# Patient Record
Sex: MALE | URBAN - METROPOLITAN AREA
[De-identification: names, ages, dates, MRNs, and addresses within clinical notes are randomized per-mention and may not be internally consistent; named-entity substitution may affect disease eponyms.]

---

## 2024-01-25 DIAGNOSIS — K74.60 CIRRHOSIS: Primary | ICD-10-CM

## 2024-01-31 ENCOUNTER — TELEPHONE (OUTPATIENT)
Dept: GASTROENTEROLOGY | Facility: CLINIC | Age: 74
End: 2024-01-31
Payer: OTHER GOVERNMENT

## 2024-01-31 NOTE — TELEPHONE ENCOUNTER
----- Message from Jeaneth Chaidez MA sent at 1/31/2024  8:35 AM CST -----  Regarding: FW: Referral  Contact: HUE Hernandez  I looked under active requests but I don't understand.  ----- Message -----  From: Carolina Pinto  Sent: 1/31/2024   8:30 AM CST  To: [facility] Staff  Subject: Referral                                         Per phone call with Mary she stated that a referral was sent over on 01/24.2024, 01/19/2024 and 01/03/2024 along with the authorization.  The patient needs to be schedule for an appointment regarding Cirrhosis.  Please return call at [phone] and ask for Mary and contact patient at [phone] (home).    Thanks,  SJ

## 2024-03-13 ENCOUNTER — TELEPHONE (OUTPATIENT)
Dept: GASTROENTEROLOGY | Facility: CLINIC | Age: 74
End: 2024-03-13
Payer: OTHER GOVERNMENT

## 2024-03-13 NOTE — TELEPHONE ENCOUNTER
----- Message from Frances Astorga MA sent at 3/12/2024 12:27 PM CDT -----    ----- Message -----  From: Shelia Gee  Sent: 3/12/2024  11:14 AM CDT  To: [facility] Staff    Patient is waiting for call back to scheduled please call back at [phone].  Refer # [id number].        Thanks  [facility]

## 2024-03-13 NOTE — TELEPHONE ENCOUNTER
----- Message from Meaghan Sargent MA sent at 3/13/2024  2:27 PM CDT -----  [facility]    ----- Message -----  From: Shey Spears  Sent: 3/13/2024   1:56 PM CDT  To: [facility]vy [facility] Staff    Call Type: Staff Message    Who Called: Fallon [facility]   Call back number: [phone]  Nature of the call: Updated status on Referral  Additional information: n/a

## 2024-04-04 ENCOUNTER — TELEPHONE (OUTPATIENT)
Dept: GASTROENTEROLOGY | Facility: CLINIC | Age: 74
End: 2024-04-04
Payer: OTHER GOVERNMENT

## 2024-04-04 NOTE — TELEPHONE ENCOUNTER
----- Message from Frances Astorga MA sent at 4/4/2024 12:23 PM CDT -----  Regarding: FW: Referral  Contact: HUE [facility]ogue in [facility]    ----- Message -----  From: Carolina Pinto  Sent: 4/4/2024  10:59 AM CDT  To: Chevy CROWLEY Staff  Subject: Referral                                         Per phone call with Lennie, she stated that was referral was sent and is needing to schedule an appointment to see the physician regarding scrotitis of the liver.  Please return call at [phone].    Thanks,  SJ

## 2024-04-04 NOTE — TELEPHONE ENCOUNTER
----- Message from Frances Astorga MA sent at 4/4/2024 12:23 PM CDT -----  Regarding: FW: Referral  Contact: HUE [facility]ogue in [facility]    ----- Message -----  From: Carolina Pinto  Sent: 4/4/2024  10:59 AM CDT  To: Chevy CROW[facility] Staff  Subject: Referral                                         Per phone call with Lennie, she stated that was referral was sent and is needing to schedule an appointment to see the physician regarding scrotitis of the liver.  Please return call at [phone].    Thanks,  SJ

## 2024-05-06 ENCOUNTER — TELEPHONE (OUTPATIENT)
Dept: GASTROENTEROLOGY | Facility: CLINIC | Age: 74
End: 2024-05-06
Payer: OTHER GOVERNMENT

## 2024-05-06 NOTE — TELEPHONE ENCOUNTER
----- Message from Shelia Gee sent at 5/6/2024 10:41 AM CDT -----  [facility] .....

## 2024-05-15 ENCOUNTER — TELEPHONE (OUTPATIENT)
Dept: GASTROENTEROLOGY | Facility: CLINIC | Age: 74
End: 2024-05-15
Payer: OTHER GOVERNMENT

## 2024-05-15 NOTE — TELEPHONE ENCOUNTER
----- Message from Frances Astorga MA sent at 5/13/2024  2:31 PM CDT -----  Regarding: referral  Contact: self  [facility] looks like the [facility] called and [facility]?  ----- Message -----  From: Eli Patten  Sent: 5/13/2024   2:17 PM CDT  To: Chevy CROW[facility] Staff    Type: Staff Message    Caller: Delta Cadenamony  Call Back Number: [phone]  Nature of the Call: pt checking the status of his referral for liver scan  Additional Information: na

## 2024-06-27 ENCOUNTER — OFFICE VISIT (OUTPATIENT)
Dept: UROLOGY | Facility: CLINIC | Age: 74
End: 2024-06-27
Payer: MEDICARE

## 2024-06-27 VITALS
SYSTOLIC BLOOD PRESSURE: 136 MMHG | DIASTOLIC BLOOD PRESSURE: 70 MMHG | HEIGHT: 67 IN | WEIGHT: 194 LBS | HEART RATE: 71 BPM | BODY MASS INDEX: 30.45 KG/M2

## 2024-06-27 DIAGNOSIS — N48.89 PENILE SWELLING: ICD-10-CM

## 2024-06-27 DIAGNOSIS — I86.1 BILATERAL VARICOCELES: ICD-10-CM

## 2024-06-27 DIAGNOSIS — R31.29 HEMATURIA, MICROSCOPIC: ICD-10-CM

## 2024-06-27 DIAGNOSIS — R30.0 BURNING WITH URINATION: Primary | ICD-10-CM

## 2024-06-27 LAB
BILIRUBIN, UA POC OHS: NEGATIVE
BLOOD, UA POC OHS: ABNORMAL
CLARITY, UA POC OHS: CLEAR
COLOR, UA POC OHS: YELLOW
GLUCOSE, UA POC OHS: >=1000
KETONES, UA POC OHS: NEGATIVE
LEUKOCYTES, UA POC OHS: NEGATIVE
NITRITE, UA POC OHS: NEGATIVE
PH, UA POC OHS: 5.5
PROTEIN, UA POC OHS: NEGATIVE
SPECIFIC GRAVITY, UA POC OHS: 1.01
UROBILINOGEN, UA POC OHS: 1

## 2024-06-27 PROCEDURE — 81003 URINALYSIS AUTO W/O SCOPE: CPT | Mod: QW,S$GLB,, | Performed by: NURSE PRACTITIONER

## 2024-06-27 PROCEDURE — 99204 OFFICE O/P NEW MOD 45 MIN: CPT | Mod: S$GLB,,, | Performed by: NURSE PRACTITIONER

## 2024-06-27 RX ORDER — ALOGLIPTIN 25 MG/1
1 TABLET, FILM COATED ORAL DAILY
COMMUNITY
Start: 2024-02-14

## 2024-06-27 RX ORDER — LOSARTAN POTASSIUM 100 MG/1
1 TABLET ORAL DAILY
COMMUNITY
Start: 2024-04-08

## 2024-06-27 RX ORDER — FERROUS SULFATE 325(65) MG
TABLET ORAL
COMMUNITY
Start: 2023-10-13

## 2024-06-27 RX ORDER — GABAPENTIN 100 MG/1
100 CAPSULE ORAL
COMMUNITY
Start: 2024-04-09

## 2024-06-27 RX ORDER — MONTELUKAST SODIUM 10 MG/1
10 TABLET ORAL
COMMUNITY
Start: 2024-02-14

## 2024-06-27 RX ORDER — POTASSIUM CHLORIDE 750 MG/1
1 TABLET, EXTENDED RELEASE ORAL DAILY
COMMUNITY
Start: 2024-02-14

## 2024-06-27 RX ORDER — MECLIZINE HYDROCHLORIDE 25 MG/1
TABLET ORAL
COMMUNITY

## 2024-06-27 RX ORDER — CIPROFLOXACIN 500 MG/1
500 TABLET ORAL
COMMUNITY
Start: 2024-06-26

## 2024-06-27 RX ORDER — SIMVASTATIN 80 MG/1
TABLET, FILM COATED ORAL
COMMUNITY

## 2024-06-27 RX ORDER — TRAMADOL HYDROCHLORIDE 50 MG/1
TABLET ORAL
COMMUNITY

## 2024-06-27 RX ORDER — SULFACETAMIDE SODIUM 100 MG/ML
SOLUTION/ DROPS OPHTHALMIC
COMMUNITY

## 2024-06-27 RX ORDER — OMEPRAZOLE 40 MG/1
1 CAPSULE, DELAYED RELEASE ORAL DAILY
COMMUNITY

## 2024-06-27 RX ORDER — SILDENAFIL 100 MG/1
100 TABLET, FILM COATED ORAL
COMMUNITY
Start: 2024-04-09

## 2024-06-27 RX ORDER — FUROSEMIDE 40 MG/1
1 TABLET ORAL 2 TIMES DAILY
COMMUNITY
Start: 2024-02-14

## 2024-06-27 RX ORDER — DOCUSATE SODIUM 100 MG/1
200 CAPSULE, LIQUID FILLED ORAL
COMMUNITY
Start: 2024-02-14

## 2024-06-27 RX ORDER — METFORMIN HYDROCHLORIDE 1000 MG/1
TABLET ORAL
COMMUNITY
Start: 2024-02-14

## 2024-06-27 RX ORDER — NEOMYCIN SULFATE, POLYMYXIN B SULFATE AND DEXAMETHASONE 3.5; 10000; 1 MG/ML; [USP'U]/ML; MG/ML
SUSPENSION/ DROPS OPHTHALMIC
COMMUNITY

## 2024-06-27 RX ORDER — CETIRIZINE HYDROCHLORIDE 10 MG/1
10 TABLET ORAL
COMMUNITY
Start: 2024-02-14

## 2024-06-27 RX ORDER — ALBUTEROL SULFATE 0.83 MG/ML
SOLUTION RESPIRATORY (INHALATION)
COMMUNITY
Start: 2024-02-14

## 2024-06-27 RX ORDER — ATORVASTATIN CALCIUM 80 MG/1
80 TABLET, FILM COATED ORAL
COMMUNITY
Start: 2024-02-14

## 2024-06-27 RX ORDER — FLUTICASONE PROPIONATE AND SALMETEROL 250; 50 UG/1; UG/1
POWDER RESPIRATORY (INHALATION)
COMMUNITY
Start: 2024-02-14

## 2024-06-27 NOTE — PROGRESS NOTES
Subjective:       Patient ID: Delta Pedersen is a 74 y.o. male.    Chief Complaint: Urinary Tract Infection      HPI: 74-year-old male, new to [facility]    Patient presents with complaint of burning and swelling to the scrotum and penis.    Patient went to the emergency room 2 days ago on 06/25/2024.    Patient was started on Cipro for UTI.    He had a CT which showed a fatty infiltration of the liver with nodular contour consistent with cirrhosis with interval worsening in the mild ascites and mesenteric haziness.  Also shows cholelithiasis.    He had an ultrasound which showed bilateral varicoceles.  Urine culture from ER visit showed no pathogens.      Patient denies any significant frequency or urgency.  Denies any odor to the urine.  Denies any fever or body aches.  Denies seeing any blood in urine.    Denies any pain to the penis.  Denies any discharge from the penis.    No other urinary complaints at this time.         Past Medical History:   Past Medical History:   Diagnosis Date    Allergies     Chronic pain     Chronic sinusitis, unspecified     DM (diabetes mellitus)     Elevated cholesterol     HTN (hypertension)        Past Surgical Historical:   Past Surgical History:   Procedure Laterality Date    HIP ARTHROPLASTY      LEG SURGERY          Medications:   Medication List with Changes/Refills   Current Medications    ALBUTEROL (PROVENTIL) 2.5 MG /3 ML (0.083 %) NEBULIZER SOLUTION    USE 1 VIAL VIA NEBULIZER EVERY 4 TO 6 HOURS AS NEEDED    ALOGLIPTIN (NESINA) 25 MG TAB    Take 1 tablet by mouth once daily.    APIXABAN (ELIQUIS) 5 MG TAB    Take 5 mg by mouth.    ATORVASTATIN (LIPITOR) 80 MG TABLET    Take 80 mg by mouth.    BRINZOLAMIDE-BRIMONIDINE (SIMBRINZA) 1-0.2 % DRPS    Apply to eye.    CETIRIZINE (ZYRTEC) 10 MG TABLET    Take 10 mg by mouth.    CIPROFLOXACIN HCL (CIPRO) 500 MG TABLET    Take 500 mg by mouth.    DOCUSATE SODIUM (COLACE) 100 MG CAPSULE    Take 200 mg by  mouth.    EMPAGLIFLOZIN (JARDIANCE) 25 MG TABLET    Take 1 tablet by mouth once daily.    FERROUS SULFATE (FEOSOL) 325 MG (65 MG IRON) TAB TABLET    TAKE 1 TABLET BY MOUTH 3 TIMES A DAY AS DIRECTED    FLUTICASONE-SALMETEROL DISKUS INHALER 250-50 MCG    Inhale into the lungs.    FUROSEMIDE (LASIX) 40 MG TABLET    Take 1 tablet by mouth 2 (two) times daily.    GABAPENTIN (NEURONTIN) 100 MG CAPSULE    Take 100 mg by mouth.    LOSARTAN (COZAAR) 100 MG TABLET    Take 1 tablet by mouth once daily.    MECLIZINE (ANTIVERT) 25 MG TABLET    TAKE 1 TABLET BY MOUTH THREE TIMES A DAY AS NEEDED FOR DIZZINESS    METFORMIN (GLUCOPHAGE) 1000 MG TABLET    Take 1 tablet twice a day by oral route.    MONTELUKAST (SINGULAIR) 10 MG TABLET    Take 10 mg by mouth.    NEOMYCIN-POLYMYXIN-DEXAMETHASONE (MAXITROL) 3.5MG/ML-10,000 UNIT/ML-0.1 % DRPS    INSTILL 2 DROPS INTO AFFECTED EYE(S) TWICE A DAY EVERY 4 HOURS WHILE AWAKE FOR 2-3 DAYS    OMEPRAZOLE (PRILOSEC) 40 MG CAPSULE    Take 1 capsule by mouth once daily.    POTASSIUM CHLORIDE (KLOR-CON) 10 MEQ TBSR    Take 1 tablet by mouth once daily.    SILDENAFIL (VIAGRA) 100 MG TABLET    Take 100 mg by mouth.    SIMVASTATIN (ZOCOR) 80 MG TABLET        SULFACETAMIDE SODIUM 10% (BLEPH-10) 10 % OPHTHALMIC SOLUTION    PLACE 2 DROPS IN BOTH EYES EVERY 4 HOURS WHILE AWAKE FOR 10 DAYS AS DIRECTED    TRAMADOL (ULTRAM) 50 MG TABLET    TAKE 1 TO 2 TABLETS BY MOUTH EVERY 8 HOURS AS NEEDED WITH 500 MG OF TYLENOL    WARFARIN SODIUM (COUMADIN IV)            Past Social History:   Social History     Socioeconomic History    Marital status: Unknown   Tobacco Use    Smoking status: Former     Types: Cigarettes     Passive exposure: Past    Smokeless tobacco: Never   Substance and Sexual Activity    Alcohol use: Yes     Alcohol/week: 1.0 standard drink of alcohol     Types: 1 Cans of beer per week    Drug use: Never    Sexual activity: Yes       Allergies:   Review of patient's allergies indicates:   Allergen  Reactions    Pcn [penicillins]         Family History:   Family History   Problem Relation Name Age of Onset    Cancer Father      Stroke Mother          Review of Systems:  Review of Systems   Constitutional:  Negative for activity change and appetite change.   HENT:  Negative for congestion and dental problem.    Eyes:  Negative for visual disturbance.   Respiratory:  Negative for chest tightness and shortness of breath.    Cardiovascular:  Negative for chest pain.   Gastrointestinal:  Negative for abdominal distention and abdominal pain.   Genitourinary:  Positive for dysuria, penile swelling and scrotal swelling. Negative for decreased urine volume, difficulty urinating, enuresis, flank pain, frequency, genital sores, hematuria, penile discharge, penile pain, testicular pain and urgency.   Musculoskeletal:  Negative for back pain and neck pain.   Skin:  Negative for color change.   Neurological:  Negative for dizziness.   Hematological:  Negative for adenopathy.   Psychiatric/Behavioral:  Negative for agitation, behavioral problems and confusion.        Physical Exam:  Physical Exam  Vitals and nursing note reviewed.   Constitutional:       Appearance: He is well-developed.   HENT:      Head: Normocephalic.   Eyes:      Pupils: Pupils are equal, round, and reactive to light.   Cardiovascular:      Rate and Rhythm: Normal rate and regular rhythm.      Heart sounds: Normal heart sounds.   Pulmonary:      Effort: Pulmonary effort is normal.      Breath sounds: Normal breath sounds.   Abdominal:      General: Bowel sounds are normal.      Palpations: Abdomen is soft.   Genitourinary:     Penis: Phimosis (there was some swelling to the penis.  Able to pull back and retract foreskin.) present.    Musculoskeletal:         General: Normal range of motion.      Cervical back: Normal range of motion and neck supple.   Skin:     General: Skin is warm and dry.   Neurological:      Mental Status: He is alert and oriented to  person, place, and time.   Psychiatric:         Behavior: Behavior normal.       Urinalysis: Glucose greater than 1000.  Small blood, red blood cells 0-3.    Assessment/Plan:   1. Burning with urination: Patient is on Cipro.  Urine culture from ER visit showed no pathogen.    Patient continue Cipro as directed.      2. Microscopic hematuria:  CT showed no renal masses.    Patient is a former smoker.  He also works with the [employer] for number years.    Will schedule patient for cysto for further evaluation.    3. Varicoceles:  Ultrasound showed bilateral varicoceles.    Patient is doing well no significant pain at this time.  Will continue to monitor.      4. Penile swelling:  Patient has swelling/edema noted to the penis.    This is likely associated with with the ascites.  Patient encouraged to follow-up with further testing as scheduled.    We discussed elevation.  Also discussed compression.    Follow-up to arrange pending cysto.  Problem List Items Addressed This Visit    None  Visit Diagnoses       Burning with urination    -  Primary    Relevant Orders    POCT Urinalysis(Instrument)    Hematuria, microscopic        Penile swelling        Bilateral varicoceles

## 2024-07-24 ENCOUNTER — TELEPHONE (OUTPATIENT)
Dept: UROLOGY | Facility: CLINIC | Age: 74
End: 2024-07-24
Payer: OTHER GOVERNMENT

## 2024-07-25 ENCOUNTER — PROCEDURE VISIT (OUTPATIENT)
Dept: UROLOGY | Facility: CLINIC | Age: 74
End: 2024-07-25
Payer: MEDICARE

## 2024-07-25 VITALS
DIASTOLIC BLOOD PRESSURE: 72 MMHG | SYSTOLIC BLOOD PRESSURE: 137 MMHG | HEIGHT: 66 IN | WEIGHT: 183.44 LBS | OXYGEN SATURATION: 96 % | RESPIRATION RATE: 18 BRPM | HEART RATE: 64 BPM | BODY MASS INDEX: 29.48 KG/M2

## 2024-07-25 DIAGNOSIS — R31.29 HEMATURIA, MICROSCOPIC: ICD-10-CM

## 2024-07-25 RX ORDER — GLIPIZIDE 10 MG/1
10 TABLET ORAL 2 TIMES DAILY
COMMUNITY

## 2024-07-25 RX ORDER — METFORMIN HYDROCHLORIDE 1000 MG/1
1000 TABLET ORAL 2 TIMES DAILY WITH MEALS
COMMUNITY
Start: 2024-02-14

## 2024-07-25 RX ORDER — LATANOPROST/PF 0.005 %
1 DROPS OPHTHALMIC (EYE) NIGHTLY
COMMUNITY
Start: 2024-05-23

## 2024-07-25 RX ORDER — ALBUTEROL SULFATE 0.83 MG/ML
2.5 SOLUTION RESPIRATORY (INHALATION)
COMMUNITY
Start: 2024-02-14

## 2024-07-25 RX ORDER — DILTIAZEM HYDROCHLORIDE 240 MG/1
240 CAPSULE, EXTENDED RELEASE ORAL DAILY
COMMUNITY
Start: 2024-06-28

## 2024-07-25 RX ORDER — POTASSIUM CHLORIDE 750 MG/1
10 TABLET, EXTENDED RELEASE ORAL DAILY
COMMUNITY
Start: 2024-02-14

## 2024-07-25 RX ORDER — SITAGLIPTIN 100 MG/1
100 TABLET ORAL DAILY
COMMUNITY
Start: 2024-07-01

## 2024-07-25 RX ORDER — ACARBOSE 50 MG/1
50 TABLET ORAL
COMMUNITY
Start: 2024-02-14

## 2024-07-25 RX ORDER — FERROUS SULFATE 325(65) MG
325 TABLET ORAL DAILY
COMMUNITY
Start: 2023-10-13

## 2024-07-25 RX ORDER — LOSARTAN POTASSIUM 100 MG/1
100 TABLET ORAL DAILY
COMMUNITY
Start: 2024-04-08

## 2024-07-25 RX ORDER — FUROSEMIDE 40 MG/1
40 TABLET ORAL 2 TIMES DAILY
COMMUNITY
Start: 2024-06-28

## 2024-07-25 NOTE — PATIENT INSTRUCTIONS
Patient Education       Cystoscopy Discharge Instructions   About this topic   Your kidneys make urine. It is stored in your bladder. The urethra is a tube at the bottom of the bladder. Urine flows out of this tube. Sometimes, there is a blockage and urine is not able to leave the body.  A cystoscopy is a procedure that lets the doctor see the inside of your bladder and urethra. The doctor does it to:  Look for stones or tumors blocking the bladder and urethra  Look for changes or injury inside the bladder  Take a tissue sample from the inside of your bladder  Look for reasons for blood in the urine, pain with urination, or why you are passing urine often  Look for prostate problems     What care is needed at home?   Ask your doctor what you need to do when you go home. Make sure you ask questions if you do not understand what the doctor says. This way you will know what you need to do.  Take a warm bath or use a warm wet washcloth over the opening to the urethra. This will help to ease any pain. Do this as needed.  Drink 6 to 8 glasses of water a day and 3 to 4 glasses in the first few hours after the procedure to flush out your bladder and reduce irritation.  You may see some blood in your urine for a few days. This is normal.  Empty your bladder as soon as you feel the need to. Don't delay going to the bathroom. It stretches and weakens the bladder.  What follow-up care is needed?   Your doctor may ask you to make visits to the office to check on your progress. Be sure to keep these visits.  If you had a biopsy, talk with your doctor about the results.  What drugs may be needed?   The doctor may order drugs to:  Help with pain  Fight an infection  Help with bladder spasms  Will physical activity be limited?   Talk to your doctor about when you may go back to your normal activities like work, driving, or sex.  What problems could happen?   Bleeding  Infection  Injury to the bladder and urethra  Discomfort in the  urethra area  Burning sensation for a short time  Upset stomach  When do I need to call the doctor?   Signs of infection. These include a fever of 100.4°F (38°C) or higher, chills, pain with passing urine.  Pain that does not go away even with drugs or that lasts longer than 2 days  Too much blood in your urine  Passing large dime-sized clots  Cloudy urine  Little or no urine or not able to pass urine  Abdominal pain and nausea  Teach Back: Helping You Understand   The Teach Back Method helps you understand the information we are giving you. After you talk with the staff, tell them in your own words what you learned. This helps to make sure the staff has described each thing clearly. It also helps to explain things that may have been confusing. Before going home, make sure you can do these:  I can tell you about my procedure.  I can tell you what may help ease my pain.  I can tell you what I will do if I have a fever, chills, or am not able to pass urine.  Where can I learn more?   American Cancer Society  https://www.cancer.org/treatment/understanding-your-diagnosis/tests/endoscopy/cystoscopy.html   [employer] Research [employer]  https://www.cancerresearchuk.org/about-cancer/bladder-cancer/getting-diagnosed/tests-diagnose/cystoscopy   NHS Choices  http://www.nhs.uk/conditions/Cystoscopy/Pages/Introduction.aspx   Last Reviewed Date   2021-04-22  Consumer Information Use and Disclaimer   This information is not specific medical advice and does not replace information you receive from your health care provider. This is only a brief summary of general information. It does NOT include all information about conditions, illnesses, injuries, tests, procedures, treatments, therapies, discharge instructions or life-style choices that may apply to you. You must talk with your health care provider for complete information about your health and treatment options. This information should not be used to decide whether or not to accept your  health care providers advice, instructions or recommendations. Only your health care provider has the knowledge and training to provide advice that is right for you.  Copyright   Copyright © 2021 [employer] and its affiliates and/or licensors. All rights reserved.

## 2024-07-25 NOTE — PROCEDURES
Cystoscopy    Date/Time: 7/25/2024 10:00 AM    Performed by: Jesus Grady MD  Authorized by: Brett Fofana NP    Timeout: prior to procedure the correct patient, procedure, and site was verified    Prep: patient was prepped and draped in usual sterile fashion    Anesthesia:  Intraurethral instillation  Indications: hematuria    Position:  Supine  Anesthesia:  Intraurethral instillation  Patient sedated?: No    Preparation: Patient was prepped and draped in usual sterile fashion    Scope type:  Flexible cystoscope  External exam normal: Yes    Urethra normal: Yes    Prostate normal: Yes    Comments:      Patient was brought to the procedure room placed on the table padded prepped and draped in usual sterile fashion in supine position. The cystoscope was inserted into the urethra and advanced the urethra was normal prostate showed expected enlargement for patient's age, the bladder is entered and inspected is found to be free of tumor stone or foreign body.  Bilateral ureteral orifices were identified and noted to be normal in appearance with clear efflux of urine at this point the scope was removed the patient tolerated the procedure well there were no complications

## 2024-10-04 ENCOUNTER — TELEPHONE (OUTPATIENT)
Dept: HEPATOLOGY | Facility: CLINIC | Age: 74
End: 2024-10-04
Payer: MEDICARE

## 2024-10-04 NOTE — TELEPHONE ENCOUNTER
Called pt about referral and he stated that he already has an appointment in [place] at [facility].

## 2024-11-20 ENCOUNTER — TELEPHONE (OUTPATIENT)
Dept: UROLOGY | Facility: CLINIC | Age: 74
End: 2024-11-20
Payer: MEDICARE

## 2024-11-20 NOTE — TELEPHONE ENCOUNTER
Pt states he was having swelling in penis, and painful urination. He will come in tomorrow to see Magaly MORGAN NP to be evaluated.     ----- Message from Kathleen sent at 11/20/2024 11:52 AM CST -----  Type:  Needs Medical Advice    Who Called: pt  Symptoms (please be specific): swelling and fluid build up   How long has patient had these symptoms:  ongoing  Pharmacy name and phone #:  [facility]  Would the patient rather a call back or a response via [facility]? call  Best Call Back Number: [phone]    Additional Information: requesting to speak with nurse asap as he is experiencing the same symptoms as last time

## 2024-11-21 ENCOUNTER — OFFICE VISIT (OUTPATIENT)
Dept: UROLOGY | Facility: CLINIC | Age: 74
End: 2024-11-21
Payer: MEDICARE

## 2024-11-21 VITALS
BODY MASS INDEX: 28.77 KG/M2 | SYSTOLIC BLOOD PRESSURE: 120 MMHG | DIASTOLIC BLOOD PRESSURE: 70 MMHG | WEIGHT: 179 LBS | HEIGHT: 66 IN

## 2024-11-21 DIAGNOSIS — N48.1 BALANITIS: Primary | ICD-10-CM

## 2024-11-21 PROCEDURE — 99213 OFFICE O/P EST LOW 20 MIN: CPT | Mod: S$PBB,,,

## 2024-11-21 RX ORDER — NYSTATIN AND TRIAMCINOLONE ACETONIDE 100000; 1 [USP'U]/G; MG/G
CREAM TOPICAL 4 TIMES DAILY
Qty: 30 G | Refills: 0 | Status: SHIPPED | OUTPATIENT
Start: 2024-11-21 | End: 2024-12-05

## 2024-11-21 NOTE — PROGRESS NOTES
Subjective:       Patient ID: Delta Pedersen is a 74 y.o. male.    Chief Complaint: No chief complaint on file.      HPI: 74-year-old male established patient presents today for penile swelling.  Patient reports for the last week he has experienced some swelling to the penile shaft and suprapubic area.  He complains of redness and tenderness around the glans penis.  Patient has a history of cirrhosis of the liver.  He denies dysuria, frequency, urgency, hematuria, odor, fever or chills.       Past Medical History:   Past Medical History:   Diagnosis Date    Allergies     Chronic pain     Chronic sinusitis, unspecified     DM (diabetes mellitus)     Elevated cholesterol     HTN (hypertension)        Past Surgical Historical:   Past Surgical History:   Procedure Laterality Date    HIP SURGERY      LEG SURGERY          Medications:   Medication List with Changes/Refills   New Medications    NYSTATIN-TRIAMCINOLONE (MYCOLOG II) CREAM    Apply topically 4 (four) times daily. for 14 days   Current Medications    ACARBOSE (PRECOSE) 50 MG TAB    Take 50 mg by mouth 3 (three) times daily with meals.    ALBUTEROL (PROVENTIL) 2.5 MG /3 ML (0.083 %) NEBULIZER SOLUTION    Inhale 2.5 mg into the lungs as needed for Shortness of Breath or Wheezing (4 times daily for asthma).    ALOGLIPTIN (NESINA) 25 MG TAB    Take 1 tablet by mouth once daily.    APIXABAN (ELIQUIS) 5 MG TAB    Take 5 mg by mouth.    ASCORBIC ACID MM    Take 500 mg by mouth once daily.    ATORVASTATIN (LIPITOR) 80 MG TABLET    Take 80 mg by mouth.    BRINZOLAMIDE-BRIMONIDINE (SIMBRINZA) 1-0.2 % DRPS    Apply to eye.    CETIRIZINE (ZYRTEC) 10 MG TABLET    Take 10 mg by mouth.    DICLOFENAC SODIUM TOP    Apply 1 % topically 3 (three) times daily.    DILTIAZEM (DILACOR XR) 240 MG CDCR    Take 240 mg by mouth once daily.    DOCUSATE SODIUM (COLACE) 100 MG CAPSULE    Take 200 mg by mouth.    EMPAGLIFLOZIN (JARDIANCE) 25 MG TABLET    Take 25 mg by mouth once daily.     FERROUS SULFATE (FEOSOL) 325 MG (65 MG IRON) TAB TABLET    Take 325 mg by mouth once daily.    FLUTICASONE-SALMETEROL DISKUS INHALER 250-50 MCG    Inhale into the lungs.    FUROSEMIDE (LASIX) 40 MG TABLET    Take 40 mg by mouth 2 (two) times a day.    GABAPENTIN (NEURONTIN) 100 MG CAPSULE    Take 100 mg by mouth.    GLIPIZIDE (GLUCOTROL) 10 MG TABLET    Take 10 mg by mouth 2 (two) times a day.    LATANOPROST, PF, 0.005 % DROP    Apply 1 drop to eye every evening.    LOSARTAN (COZAAR) 100 MG TABLET    Take 100 mg by mouth once daily.    MECLIZINE (ANTIVERT) 25 MG TABLET    TAKE 1 TABLET BY MOUTH THREE TIMES A DAY AS NEEDED FOR DIZZINESS    METFORMIN (GLUCOPHAGE) 1000 MG TABLET    Take 1,000 mg by mouth 2 (two) times daily with meals.    MONTELUKAST (SINGULAIR) 10 MG TABLET    Take 10 mg by mouth.    OMEPRAZOLE (PRILOSEC) 40 MG CAPSULE    Take 1 capsule by mouth once daily.    POTASSIUM CHLORIDE (KLOR-CON) 10 MEQ TBSR    Take 10 mEq by mouth once daily.    SILDENAFIL (VIAGRA) 100 MG TABLET    Take 100 mg by mouth.    SIMVASTATIN (ZOCOR) 80 MG TABLET        SITAGLIPTIN 100 MG TAB    Take 100 mg by mouth once daily.    TRAMADOL (ULTRAM) 50 MG TABLET    TAKE 1 TO 2 TABLETS BY MOUTH EVERY 8 HOURS AS NEEDED WITH 500 MG OF TYLENOL        Past Social History:   Social History     Socioeconomic History    Marital status: Unknown   Tobacco Use    Smoking status: Former     Types: Cigarettes     Passive exposure: Past    Smokeless tobacco: Never   Substance and Sexual Activity    Alcohol use: Yes     Alcohol/week: 1.0 standard drink of alcohol     Types: 1 Cans of beer per week    Drug use: Never    Sexual activity: Yes       Allergies:   Review of patient's allergies indicates:   Allergen Reactions    Pcn [penicillins]         Family History:   Family History   Problem Relation Name Age of Onset    Cancer Father      Stroke Mother          Review of Systems:  Review of Systems   Constitutional: Negative.    HENT: Negative.      Eyes: Negative.    Respiratory: Negative.     Cardiovascular: Negative.    Gastrointestinal: Negative.    Endocrine: Negative.    Genitourinary:  Positive for penile pain and penile swelling.   Musculoskeletal: Negative.    Skin: Negative.    Allergic/Immunologic: Negative.    Neurological: Negative.    Hematological: Negative.    Psychiatric/Behavioral: Negative.       Physical Exam:  Physical Exam  Constitutional:       Appearance: Normal appearance.   Cardiovascular:      Rate and Rhythm: Normal rate.   Pulmonary:      Effort: Pulmonary effort is normal.   Abdominal:      General: Bowel sounds are normal.      Palpations: Abdomen is soft.   Genitourinary:     Penis: Circumcised. Erythema and tenderness present.       Prostate: Normal.      Comments: Moisture noted to glans penis with maceration of tissue and small amount of smegma  Neurological:      Mental Status: He is alert and oriented to person, place, and time.   Urinalysis: WBCs negative, RBCs 0-2 trace, epithelial +1, bacteria negative, mucus negative, nitrite negative  Assessment/Plan:     Balanitis:  Patient's suprapubic and penile swelling is secondary to his cirrhosis of the liver.  He has some firm abdominal distention and reports that started concurrently with the suprapubic and penile swelling.  Instructed him to follow up with his GI doctor that follows his liver disease.  He does however have some redness, smegma, and maceration to the glans penis so we will start him on nystatin/ triamcinolone cream.  Discussed with patient appropriate hygiene by retracting foreskin and cleaning with soap and water.  Avoiding excessive moisture and using thin layer of cream twice a day.  Patient verbalized understanding     Follow up in 1 month  Problem List Items Addressed This Visit    None  Visit Diagnoses       Balanitis    -  Primary

## 2024-12-27 ENCOUNTER — OFFICE VISIT (OUTPATIENT)
Dept: UROLOGY | Facility: CLINIC | Age: 74
End: 2024-12-27
Payer: MEDICARE

## 2024-12-27 VITALS
WEIGHT: 179 LBS | SYSTOLIC BLOOD PRESSURE: 147 MMHG | BODY MASS INDEX: 28.77 KG/M2 | HEIGHT: 66 IN | DIASTOLIC BLOOD PRESSURE: 68 MMHG | HEART RATE: 95 BPM

## 2024-12-27 DIAGNOSIS — N48.89 PENILE SWELLING: Primary | ICD-10-CM

## 2024-12-27 NOTE — PROGRESS NOTES
Subjective:       Patient ID: Delta Pedersen is a 74 y.o. male.    Chief Complaint: No chief complaint on file.      HPI: 74-year-old male established patient presents for 1 month following for penile swelling.  Previously the patient reported swelling to the penile shaft and suprapubic area.  He complains of redness and tenderness around the glans penis. He denies dysuria, frequency, urgency, hematuria, odor, fever or chills.  Patient was recently treated with several rounds of antibiotics following COVID diagnosis.  He has seen improvement and the swelling has resolved completely.  No other complaints at this time         Past Medical History:   Past Medical History:   Diagnosis Date    Allergies     Chronic pain     Chronic sinusitis, unspecified     DM (diabetes mellitus)     Elevated cholesterol     HTN (hypertension)        Past Surgical Historical:   Past Surgical History:   Procedure Laterality Date    HIP SURGERY      LEG SURGERY          Medications:   Medication List with Changes/Refills   Current Medications    ACARBOSE (PRECOSE) 50 MG TAB    Take 50 mg by mouth 3 (three) times daily with meals.    ALBUTEROL (PROVENTIL) 2.5 MG /3 ML (0.083 %) NEBULIZER SOLUTION    Inhale 2.5 mg into the lungs as needed for Shortness of Breath or Wheezing (4 times daily for asthma).    ALOGLIPTIN (NESINA) 25 MG TAB    Take 1 tablet by mouth once daily.    APIXABAN (ELIQUIS) 5 MG TAB    Take 5 mg by mouth.    ASCORBIC ACID MM    Take 500 mg by mouth once daily.    ATORVASTATIN (LIPITOR) 80 MG TABLET    Take 80 mg by mouth.    BRINZOLAMIDE-BRIMONIDINE (SIMBRINZA) 1-0.2 % DRPS    Apply to eye.    CETIRIZINE (ZYRTEC) 10 MG TABLET    Take 10 mg by mouth.    DICLOFENAC SODIUM TOP    Apply 1 % topically 3 (three) times daily.    DILTIAZEM (DILACOR XR) 240 MG CDCR    Take 240 mg by mouth once daily.    DOCUSATE SODIUM (COLACE) 100 MG CAPSULE    Take 200 mg by mouth.    EMPAGLIFLOZIN (JARDIANCE) 25 MG TABLET    Take 25 mg by mouth  once daily.    FERROUS SULFATE (FEOSOL) 325 MG (65 MG IRON) TAB TABLET    Take 325 mg by mouth once daily.    FLUTICASONE-SALMETEROL DISKUS INHALER 250-50 MCG    Inhale into the lungs.    FUROSEMIDE (LASIX) 40 MG TABLET    Take 40 mg by mouth 2 (two) times a day.    GABAPENTIN (NEURONTIN) 100 MG CAPSULE    Take 100 mg by mouth.    GLIPIZIDE (GLUCOTROL) 10 MG TABLET    Take 10 mg by mouth 2 (two) times a day.    LATANOPROST, PF, 0.005 % DROP    Apply 1 drop to eye every evening.    LOSARTAN (COZAAR) 100 MG TABLET    Take 100 mg by mouth once daily.    MECLIZINE (ANTIVERT) 25 MG TABLET    TAKE 1 TABLET BY MOUTH THREE TIMES A DAY AS NEEDED FOR DIZZINESS    METFORMIN (GLUCOPHAGE) 1000 MG TABLET    Take 1,000 mg by mouth 2 (two) times daily with meals.    MONTELUKAST (SINGULAIR) 10 MG TABLET    Take 10 mg by mouth.    NYSTATIN-TRIAMCINOLONE (MYCOLOG II) CREAM    Apply topically 4 (four) times daily. for 14 days    OMEPRAZOLE (PRILOSEC) 40 MG CAPSULE    Take 1 capsule by mouth once daily.    POTASSIUM CHLORIDE (KLOR-CON) 10 MEQ TBSR    Take 10 mEq by mouth once daily.    SILDENAFIL (VIAGRA) 100 MG TABLET    Take 100 mg by mouth.    SIMVASTATIN (ZOCOR) 80 MG TABLET        SITAGLIPTIN 100 MG TAB    Take 100 mg by mouth once daily.    TRAMADOL (ULTRAM) 50 MG TABLET    TAKE 1 TO 2 TABLETS BY MOUTH EVERY 8 HOURS AS NEEDED WITH 500 MG OF TYLENOL        Past Social History:   Social History     Socioeconomic History    Marital status: Unknown   Tobacco Use    Smoking status: Former     Types: Cigarettes     Passive exposure: Past    Smokeless tobacco: Never   Substance and Sexual Activity    Alcohol use: Yes     Alcohol/week: 1.0 standard drink of alcohol     Types: 1 Cans of beer per week    Drug use: Never    Sexual activity: Yes       Allergies:   Review of patient's allergies indicates:   Allergen Reactions    Penicillins Other (See Comments), Rash, Shortness Of Breath and Swelling     Reaction: Unknown        Family  History:   Family History   Problem Relation Name Age of Onset    Cancer Father      Stroke Mother          Review of Systems:  Review of Systems   Constitutional: Negative.    HENT: Negative.     Eyes: Negative.    Respiratory: Negative.     Cardiovascular: Negative.    Gastrointestinal: Negative.    Endocrine: Negative.    Genitourinary:  Negative for penile pain and penile swelling.   Musculoskeletal: Negative.    Skin: Negative.    Allergic/Immunologic: Negative.    Neurological: Negative.    Hematological: Negative.    Psychiatric/Behavioral: Negative.         Physical Exam:  Physical Exam  Constitutional:       Appearance: Normal appearance. He is normal weight.   HENT:      Head: Normocephalic.      Nose: Nose normal.      Mouth/Throat:      Mouth: Mucous membranes are moist.      Pharynx: Oropharynx is clear.   Eyes:      Extraocular Movements: Extraocular movements intact.      Conjunctiva/sclera: Conjunctivae normal.      Pupils: Pupils are equal, round, and reactive to light.   Cardiovascular:      Rate and Rhythm: Normal rate and regular rhythm.      Pulses: Normal pulses.      Heart sounds: Normal heart sounds.   Pulmonary:      Effort: Pulmonary effort is normal.      Breath sounds: Normal breath sounds.   Abdominal:      General: Abdomen is flat. Bowel sounds are normal.      Palpations: Abdomen is soft.      Hernia: There is no hernia in the right inguinal area or left inguinal area.   Genitourinary:     Penis: Circumcised. Erythema and tenderness present. No phimosis, paraphimosis, hypospadias or discharge.       Testes: Normal.         Right: Mass, tenderness or swelling not present. Right testis is descended. Cremasteric reflex is present.          Left: Mass, tenderness or swelling not present. Left testis is descended. Cremasteric reflex is present.       Prostate: Normal.      Rectum: Normal.      Comments: Moisture noted to glans penis with maceration of tissue and small amount of  smegma  Musculoskeletal:         General: Normal range of motion.      Cervical back: Normal range of motion and neck supple.   Lymphadenopathy:      Lower Body: No right inguinal adenopathy. No left inguinal adenopathy.   Skin:     General: Skin is warm and dry.      Capillary Refill: Capillary refill takes less than 2 seconds.   Neurological:      General: No focal deficit present.      Mental Status: He is alert and oriented to person, place, and time. Mental status is at baseline.   Psychiatric:         Mood and Affect: Mood normal.         Behavior: Behavior normal.         Thought Content: Thought content normal.         Judgment: Judgment normal.       Assessment/Plan:     Balanitis:  Patient's symptoms have improved.  No complaints at this time.  Follow up as needed.    Please schedule patient with Dr. Grady if he experiences any issues in the future  Problem List Items Addressed This Visit    None

## 2025-01-07 ENCOUNTER — OUTSIDE PLACE OF SERVICE (OUTPATIENT)
Dept: GASTROENTEROLOGY | Facility: CLINIC | Age: 75
End: 2025-01-07

## 2025-01-07 ENCOUNTER — OUTSIDE PLACE OF SERVICE (OUTPATIENT)
Dept: GASTROENTEROLOGY | Facility: CLINIC | Age: 75
End: 2025-01-07
Payer: MEDICARE

## 2025-01-08 ENCOUNTER — OUTSIDE PLACE OF SERVICE (OUTPATIENT)
Dept: GASTROENTEROLOGY | Facility: CLINIC | Age: 75
End: 2025-01-08
Payer: MEDICARE

## 2025-01-08 LAB — CRC RECOMMENDATION EXT: NORMAL

## 2025-01-09 ENCOUNTER — TELEPHONE (OUTPATIENT)
Dept: GASTROENTEROLOGY | Facility: CLINIC | Age: 75
End: 2025-01-09
Payer: MEDICARE

## 2025-01-23 ENCOUNTER — TELEPHONE (OUTPATIENT)
Dept: GASTROENTEROLOGY | Facility: CLINIC | Age: 75
End: 2025-01-23
Payer: MEDICARE

## 2025-01-24 NOTE — TELEPHONE ENCOUNTER
1 hyp.    Notify patient that his colon polyp was benign.  He should follow up with Dr. Talamantes in clinic.  NBP

## 2025-01-25 NOTE — TELEPHONE ENCOUNTER
Shelia Gee Staff  Caller: Unspecified (Yesterday, 12:47 PM)  Type:  Patient Returning Call    Who Called: patient  Who Left Message for Patient:nurse  Does the patient know what this is regarding?:  Would the patient rather a call back or a response via [facility]?  Call back  Best Call Back Number: [phone]     Staff return call.. pt notified and advised to f/u w/Dr Talamantes..Adri CROW

## 2025-02-10 ENCOUNTER — DOCUMENTATION ONLY (OUTPATIENT)
Dept: GASTROENTEROLOGY | Facility: CLINIC | Age: 75
End: 2025-02-10
Payer: MEDICARE

## 2025-05-05 ENCOUNTER — TELEPHONE (OUTPATIENT)
Dept: HEMATOLOGY/ONCOLOGY | Facility: CLINIC | Age: 75
End: 2025-05-05
Payer: MEDICARE

## 2025-05-05 NOTE — TELEPHONE ENCOUNTER
----- Message from Blossom sent at 5/5/2025  4:16 PM CDT -----  Contact: EBER CHURCH [[id number]]  ..Type:  Patient Requesting CallWho Called:EBER CHURCH [[id number]]Would the patient rather a call back or a response via [facility]? CallBe Call Back Number:[phone] Information: Patient called to request provider [facility] to see provider.

## 2025-06-05 ENCOUNTER — TELEPHONE (OUTPATIENT)
Dept: HEMATOLOGY/ONCOLOGY | Facility: CLINIC | Age: 75
End: 2025-06-05
Payer: MEDICARE

## 2025-06-05 DIAGNOSIS — D51.0 PERNICIOUS ANEMIA: Primary | ICD-10-CM

## 2025-06-05 DIAGNOSIS — D50.0 ANEMIA DUE TO CHRONIC BLOOD LOSS: ICD-10-CM

## 2025-06-11 ENCOUNTER — OFFICE VISIT (OUTPATIENT)
Dept: HEMATOLOGY/ONCOLOGY | Facility: CLINIC | Age: 75
End: 2025-06-11
Payer: MEDICARE

## 2025-06-11 VITALS
HEIGHT: 66 IN | SYSTOLIC BLOOD PRESSURE: 146 MMHG | DIASTOLIC BLOOD PRESSURE: 75 MMHG | BODY MASS INDEX: 30.35 KG/M2 | HEART RATE: 67 BPM | WEIGHT: 188.88 LBS | RESPIRATION RATE: 16 BRPM | OXYGEN SATURATION: 95 %

## 2025-06-11 DIAGNOSIS — D64.9 ANEMIA, UNSPECIFIED TYPE: Primary | ICD-10-CM

## 2025-06-11 DIAGNOSIS — D53.9 NUTRITIONAL ANEMIA, UNSPECIFIED: ICD-10-CM

## 2025-06-11 DIAGNOSIS — D64.89 ANEMIA DUE TO OTHER CAUSE, NOT CLASSIFIED: ICD-10-CM

## 2025-06-11 DIAGNOSIS — D51.3 OTHER DIETARY VITAMIN B12 DEFICIENCY ANEMIA: ICD-10-CM

## 2025-06-11 DIAGNOSIS — D50.0 ANEMIA DUE TO CHRONIC BLOOD LOSS: ICD-10-CM

## 2025-06-11 LAB
ABS NRBC COUNT: 0 X 10 3/UL (ref 0–0.01)
ABSOLUTE BASOPHIL: 0.11 X 10 3/UL (ref 0–0.22)
ABSOLUTE EOSINOPHIL: 0.34 X 10 3/UL (ref 0.04–0.54)
ABSOLUTE LYMPHOCYTE: 0.62 X 10 3/UL (ref 0.86–4.75)
ABSOLUTE MONOCYTE: 0.45 X 10 3/UL (ref 0.22–1.08)
ABSOLUTE RETIC: 0.07 X 10 6/UL (ref 0.03–0.1)
ADDITIONAL TESTING: NORMAL
B12: 661 PG/ML (ref 232–1245)
B19V IGG+IGM PNL SER: 26.4 % (ref 2.3–13.4)
BANDS: 4 % (ref 1–5)
BASOPHILS NFR BLD: 2 % (ref 0–1)
BLAST CELL: ABNORMAL
CORTIS SERPL-MCNC: ABNORMAL UG/DL
EOSINOPHIL NFR BLD: 6 % (ref 1–7)
FERRITIN: 56.9 NG/ML (ref 20–380)
FOLATE SERPL-MCNC: 27 NG/ML (ref 5.6–45.8)
HCT VFR BLD AUTO: 24.4 % (ref 42–52)
HGB BLD-MCNC: 8.7 G/DL (ref 14–18)
IPF: 8.8 % (ref 1.1–6.1)
IRON BINDING CAPACITY: 311 UG/DL (ref 262–472)
IRON SERPL-MCNC: 114 UG/DL (ref 59–158)
LYMPHOCYTES NFR BLD: 11 % (ref 19–53)
MCH RBC QN AUTO: 30.7 PG (ref 27–32)
MCHC RBC AUTO-ENTMCNC: 35.7 G/DL (ref 32–36)
MCV RBC AUTO: 86.2 FL (ref 80–94)
METAMYELOCYTES # BLD MANUAL: ABNORMAL 10*3/UL
MONOCYTES NFR BLD: 8 % (ref 5–13)
NEUTROPHILS # BLD AUTO: 4.11 X 10 3/UL (ref 2.32–6.7)
NUCLEATED RED BLOOD CELLS: 0 /100 WBC (ref 0–0.2)
OTHER: ABNORMAL
PATHOLOGIST REVIEW: ABNORMAL
PLATELET # BLD AUTO: 171 X 10 3/UL (ref 135–400)
PROMYELOCYTES: ABNORMAL
RBC # BLD AUTO: 2.83 X 10 6/UL (ref 4.7–6.1)
RBC & PLT MORPHOLOGY: ABNORMAL
RDW-SD: 50.8 FL (ref 37–54)
REACTIVE LYMPHOCYTES: ABNORMAL
RET-HE: 34.5 PG (ref 29.8–39)
RETICULOCYTE COUNT: 2.6 % (ref 0.5–1.8)
SEGMENTERS: 69 % (ref 34–71)
UIBC SERPL-MCNC: 197 UG/DL (ref 112–346)
WBC # BLD: 5.63 X 10 3/UL (ref 4.3–10.8)

## 2025-06-11 RX ORDER — ACETAMINOPHEN AND PHENYLEPHRINE HCL 325; 5 MG/1; MG/1
TABLET ORAL
COMMUNITY

## 2025-06-11 NOTE — PROGRESS NOTES
HEMATOLOGY INITIAL CONSULTATION NOTE    Patient ID: Delta Pedersen is a 75 y.o. male.    Chief Complaint:  Anemia    HPI:  Patient is a 75-year-old male with past medical history of seasonal allergies, chronic pain, chronic sinusitis, diabetes mellitus, hepatic cirrhosis with hepatic nodule being followed at [facility], hypertension, hyperlipidemia who was referred for evaluation of anemia.  Patient denies any dark-colored stools.  Reports being up-to-date on colonoscopy.  Presents to our clinic today for further evaluation                  Past Medical History:   Diagnosis Date    Allergies     Chronic pain     Chronic sinusitis, unspecified     DM (diabetes mellitus)     Elevated cholesterol     Encounter for blood transfusion     HTN (hypertension)        Family History   Problem Relation Name Age of Onset    Stroke Mother      Cancer Father      Hypertension Sister      Hypertension Maternal Aunt      Hypertension Maternal Uncle      Hypertension Paternal Aunt      Hypertension Paternal Uncle      Hypertension Maternal Grandmother      Hypertension Maternal Grandfather      Hypertension Paternal Grandmother      Hypertension Paternal Grandfather         Social History[1]      Past Surgical History:   Procedure Laterality Date    LEG SURGERY Right              Review of systems:  Review of Systems   Constitutional:  Negative for activity change, appetite change, chills, diaphoresis, fatigue and unexpected weight change.   HENT:  Negative for congestion, facial swelling, hearing loss, mouth sores, trouble swallowing and voice change.    Eyes:  Negative for photophobia, pain, discharge and itching.   Respiratory:  Negative for apnea, cough, choking, chest tightness and shortness of breath.    Cardiovascular:  Negative for chest pain, palpitations and leg swelling.   Gastrointestinal:  Negative for abdominal distention, abdominal pain, anal bleeding and blood in stool.   Endocrine: Negative for cold intolerance, heat  intolerance, polydipsia and polyphagia.   Genitourinary:  Negative for difficulty urinating, dysuria, flank pain and hematuria.   Musculoskeletal:  Negative for arthralgias, back pain, joint swelling, myalgias, neck pain and neck stiffness.   Skin:  Negative for color change, pallor and wound.   Allergic/Immunologic: Negative for environmental allergies, food allergies and immunocompromised state.   Neurological:  Negative for dizziness, seizures, facial asymmetry, speech difficulty, light-headedness, numbness and headaches.   Hematological:  Negative for adenopathy. Does not bruise/bleed easily.   Psychiatric/Behavioral:  Negative for agitation, behavioral problems, confusion, decreased concentration and sleep disturbance.                                            Physical Exam  Vitals and nursing note reviewed.   Constitutional:       General: He is not in acute distress.     Appearance: Normal appearance. He is not ill-appearing.   HENT:      Head: Normocephalic and atraumatic.      Nose: No congestion or rhinorrhea.   Eyes:      General: No scleral icterus.     Extraocular Movements: Extraocular movements intact.      Pupils: Pupils are equal, round, and reactive to light.   Cardiovascular:      Rate and Rhythm: Normal rate and regular rhythm.      Pulses: Normal pulses.      Heart sounds: Normal heart sounds. No murmur heard.     No gallop.   Pulmonary:      Effort: Pulmonary effort is normal. No respiratory distress.      Breath sounds: Normal breath sounds. No stridor. No wheezing or rhonchi.   Abdominal:      General: Bowel sounds are normal. There is no distension.      Palpations: There is no mass.      Tenderness: There is no abdominal tenderness. There is no guarding.   Musculoskeletal:         General: No swelling, tenderness, deformity or signs of injury. Normal range of motion.      Cervical back: Normal range of motion and neck supple. No rigidity. No muscular tenderness.      Right lower leg: No  edema.      Left lower leg: No edema.   Skin:     General: Skin is warm.      Coloration: Skin is not jaundiced or pale.      Findings: No bruising or lesion.   Neurological:      General: No focal deficit present.      Mental Status: He is alert and oriented to person, place, and time.      Cranial Nerves: No cranial nerve deficit.      Sensory: No sensory deficit.      Motor: No weakness.      Gait: Gait normal.   Psychiatric:         Mood and Affect: Mood normal.         Behavior: Behavior normal.         Thought Content: Thought content normal.       Vitals:    06/11/25 1013   BP: (!) 146/75   Pulse: 67   Resp: 16   Body surface area is 2 meters squared.    Assessment/Plan:      Anemia:    == Reviewed clinical history and history of liver cirrhosis with a liver nodule.  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  I will obtain peripheral smear and flow cytometry for completion.  Considering his age MDS is also a possibility but will obtain relevant labs:  Including checking for B12, folate and iron profile for completion.  If any abnormalities are noted on flow then will consider a bone marrow biopsy.  In the interim I discussed with him several etiologies possible for anemia in this patient giving him opportunity to ask questions        Plan:  Obtain lab work as above today        Return to clinic in 4 weeks for MD visit after the results of the above lab work is back          Pt is instructed to RTC with labs for continued monitoring of treatment as instructed.     Total time spent in counseling and discussion about further management options including relevant lab work, treatment,  prognosis, medications and intended side effects was more than 60 minutes. More than 50 % of the time was spent in counseling and coordination of care.  I spent a total of 60 minutes on the day of the visit.This includes face to face time  and non-face to face time preparing to see the patient (eg, review of tests), Obtaining and/or reviewing separately obtained history, Documenting clinical information in the electronic or other health record, Independently interpreting resultsand communicating results to the patient/family/caregiver, or Care coordination.          [1]   Social History  Socioeconomic History    Marital status: Single   Tobacco Use    Smoking status: Former     Types: Cigarettes     Passive exposure: Past    Smokeless tobacco: Never   Substance and Sexual Activity    Alcohol use: Not Currently    Drug use: Never    Sexual activity: Yes

## 2025-06-12 LAB — ERYTHROPOIETIN: 142.1 MIU/ML (ref 2.6–18.5)

## 2025-06-16 ENCOUNTER — TELEPHONE (OUTPATIENT)
Dept: HEMATOLOGY/ONCOLOGY | Facility: CLINIC | Age: 75
End: 2025-06-16
Payer: MEDICARE

## 2025-06-16 NOTE — TELEPHONE ENCOUNTER
Copied from CRM #[id number]. Topic: General Inquiry - Test Results  >> Jun 16, 2025  1:40 PM Shelia wrote:  Patient is calling in regards to see if hard copy is ready to  please call him back at [phone]

## 2025-06-16 NOTE — TELEPHONE ENCOUNTER
Copied from CRM #[id number]. Topic: General Inquiry - Test Results  >> Jun 16, 2025  1:24 PM Mahogany wrote:  Type:  Test Results    Who Called: Delta Pedersen  Name of Test (Lab/Mammo/Etc): lab  Date of Test: 06/2025  Ordering Provider: beni  Where the test was performed: office  Would the patient rather a call back or a response via [facility]?   Best Call Back Number: [phone]  Additional Information:  pt would to  paper copy of results and after visit summary

## 2025-07-09 ENCOUNTER — OFFICE VISIT (OUTPATIENT)
Dept: HEMATOLOGY/ONCOLOGY | Facility: CLINIC | Age: 75
End: 2025-07-09
Payer: MEDICARE

## 2025-07-09 ENCOUNTER — TELEPHONE (OUTPATIENT)
Dept: HEMATOLOGY/ONCOLOGY | Facility: CLINIC | Age: 75
End: 2025-07-09

## 2025-07-09 VITALS
HEIGHT: 66 IN | OXYGEN SATURATION: 96 % | SYSTOLIC BLOOD PRESSURE: 115 MMHG | DIASTOLIC BLOOD PRESSURE: 69 MMHG | BODY MASS INDEX: 32.43 KG/M2 | WEIGHT: 201.81 LBS | RESPIRATION RATE: 16 BRPM | HEART RATE: 62 BPM

## 2025-07-09 DIAGNOSIS — D50.0 ANEMIA DUE TO CHRONIC BLOOD LOSS: Primary | ICD-10-CM

## 2025-07-09 DIAGNOSIS — D64.9 ANEMIA, UNSPECIFIED TYPE: ICD-10-CM

## 2025-07-09 PROCEDURE — 99215 OFFICE O/P EST HI 40 MIN: CPT | Mod: S$PBB,,, | Performed by: NURSE PRACTITIONER

## 2025-07-09 PROCEDURE — G2211 COMPLEX E/M VISIT ADD ON: HCPCS | Mod: ,,, | Performed by: NURSE PRACTITIONER

## 2025-07-09 NOTE — PROGRESS NOTES
HEMATOLOGY INITIAL CONSULTATION NOTE    Patient ID: Delta Pedersen is a 75 y.o. male.    Chief Complaint:  Anemia    HPI:  Patient is a 75-year-old male with past medical history of seasonal allergies, chronic pain, chronic sinusitis, diabetes mellitus, hepatic cirrhosis with hepatic nodule being followed at [facility], hypertension, hyperlipidemia who was referred for evaluation of anemia.  Patient denies any dark-colored stools.  Reports being up-to-date on colonoscopy.  Presents to our clinic today for further evaluation      Past Medical History:   Diagnosis Date    Allergies     Chronic pain     Chronic sinusitis, unspecified     DM (diabetes mellitus)     Elevated cholesterol     Encounter for blood transfusion     HTN (hypertension)        Family History   Problem Relation Name Age of Onset    Stroke Mother      Cancer Father      Hypertension Sister      Hypertension Maternal Aunt      Hypertension Maternal Uncle      Hypertension Paternal Aunt      Hypertension Paternal Uncle      Hypertension Maternal Grandmother      Hypertension Maternal Grandfather      Hypertension Paternal Grandmother      Hypertension Paternal Grandfather         Social History[1]      Past Surgical History:   Procedure Laterality Date    LEG SURGERY Right              Review of systems:  Review of Systems   Constitutional:  Negative for activity change, appetite change, chills, diaphoresis, fatigue and unexpected weight change.   HENT:  Negative for congestion, facial swelling, hearing loss, mouth sores, trouble swallowing and voice change.    Eyes:  Negative for photophobia, pain, discharge and itching.   Respiratory:  Negative for apnea, cough, choking, chest tightness and shortness of breath.    Cardiovascular:  Negative for chest pain, palpitations and leg swelling.   Gastrointestinal:  Negative for abdominal distention, abdominal pain, anal bleeding and blood in stool.   Endocrine: Negative for cold intolerance, heat  intolerance, polydipsia and polyphagia.   Genitourinary:  Negative for difficulty urinating, dysuria, flank pain and hematuria.   Musculoskeletal:  Negative for arthralgias, back pain, joint swelling, myalgias, neck pain and neck stiffness.   Skin:  Negative for color change, pallor and wound.   Allergic/Immunologic: Negative for environmental allergies, food allergies and immunocompromised state.   Neurological:  Negative for dizziness, seizures, facial asymmetry, speech difficulty, light-headedness, numbness and headaches.   Hematological:  Negative for adenopathy. Does not bruise/bleed easily.   Psychiatric/Behavioral:  Negative for agitation, behavioral problems, confusion, decreased concentration and sleep disturbance.                                            Physical Exam  Vitals and nursing note reviewed.   Constitutional:       General: He is not in acute distress.     Appearance: Normal appearance. He is not ill-appearing.   HENT:      Head: Normocephalic and atraumatic.      Nose: No congestion or rhinorrhea.   Eyes:      General: No scleral icterus.     Extraocular Movements: Extraocular movements intact.      Pupils: Pupils are equal, round, and reactive to light.   Cardiovascular:      Rate and Rhythm: Normal rate and regular rhythm.      Pulses: Normal pulses.      Heart sounds: Normal heart sounds. No murmur heard.     No gallop.   Pulmonary:      Effort: Pulmonary effort is normal. No respiratory distress.      Breath sounds: Normal breath sounds. No stridor. No wheezing or rhonchi.   Abdominal:      General: Bowel sounds are normal. There is no distension.      Palpations: There is no mass.      Tenderness: There is no abdominal tenderness. There is no guarding.   Musculoskeletal:         General: No swelling, tenderness, deformity or signs of injury. Normal range of motion.      Cervical back: Normal range of motion and neck supple. No rigidity. No muscular tenderness.      Right lower leg: No  edema.      Left lower leg: No edema.   Skin:     General: Skin is warm.      Coloration: Skin is not jaundiced or pale.      Findings: No bruising or lesion.   Neurological:      General: No focal deficit present.      Mental Status: He is alert and oriented to person, place, and time.      Cranial Nerves: No cranial nerve deficit.      Sensory: No sensory deficit.      Motor: No weakness.      Gait: Gait normal.   Psychiatric:         Mood and Affect: Mood normal.         Behavior: Behavior normal.         Thought Content: Thought content normal.       Vitals:    07/09/25 0759   BP: 115/69   Pulse: 62   Resp: 16   Body surface area is 2.06 meters squared.    Assessment/Plan:      Anemia:    == Reviewed clinical history and history of liver cirrhosis with a liver nodule.  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  I will obtain peripheral smear and flow cytometry for completion.  Considering his age MDS is also a possibility but will obtain relevant labs:  Including checking for B12, folate and iron profile for completion.  If any abnormalities are noted on flow then will consider a bone marrow biopsy.  In the interim I discussed with him several etiologies possible for anemia in this patient giving him opportunity to ask questions  7/9/25: c/o excessive weakness and fatigue, hgb 8.3, no evidence of GENIA, iron sat 54%, UTD on c-scope and EGD with Dr Salmon at [facility] in 5/2025. Flow cytometry and peripheral smear WNL.  Discussed possible causes for fatigue, advised to f/u with PCP.     Plan:  1 unit PRBC    Return to clinic in 8 weeks for MD visit after the results of the above lab work is back cbc cmp           Pt is instructed to RTC with labs for continued monitoring of treatment as instructed.     Total time spent in counseling and discussion about further management options including relevant lab work, treatment,   prognosis, medications and intended side effects was more than 60 minutes. More than 50 % of the time was spent in counseling and coordination of care.  I spent a total of 60 minutes on the day of the visit.This includes face to face time and non-face to face time preparing to see the patient (eg, review of tests), Obtaining and/or reviewing separately obtained history, Documenting clinical information in the electronic or other health record, Independently interpreting resultsand communicating results to the patient/family/caregiver, or Care coordination.            [1]   Social History  Socioeconomic History    Marital status: Single   Tobacco Use    Smoking status: Former     Types: Cigarettes     Passive exposure: Past    Smokeless tobacco: Never   Substance and Sexual Activity    Alcohol use: Not Currently    Drug use: Never    Sexual activity: Yes

## 2025-07-14 ENCOUNTER — OUTSIDE PLACE OF SERVICE (OUTPATIENT)
Dept: GASTROENTEROLOGY | Facility: CLINIC | Age: 75
End: 2025-07-14
Payer: MEDICARE

## 2025-07-23 ENCOUNTER — TELEPHONE (OUTPATIENT)
Dept: HEMATOLOGY/ONCOLOGY | Facility: CLINIC | Age: 75
End: 2025-07-23

## 2025-07-23 DIAGNOSIS — D50.0 ANEMIA DUE TO CHRONIC BLOOD LOSS: Primary | ICD-10-CM

## 2025-07-23 NOTE — TELEPHONE ENCOUNTER
Copied from CRM #[id number]. Topic: General Inquiry - Patient Advice  >> Jul 23, 2025 10:28 AM Rose wrote:  Type:  Needs Medical Advice    Who Called: Frances/friend  Symptoms (please be specific):  very weak, anemia, barely walk  How long has patient had these symptoms:    Pharmacy name and phone #:    Would the patient rather a call back or a response via [facility]? Call back  Best Call Back Number:  [phone]  Additional Information:  Patient's neighbor called because he is very weak, anemic, and can barely walk. He was just released from the hospital after a week on Sunday. She is not sure if he needs blood or not. Please call back [phone] or Frances at [phone] if he does not answer.

## 2025-07-24 ENCOUNTER — TELEPHONE (OUTPATIENT)
Dept: HEMATOLOGY/ONCOLOGY | Facility: CLINIC | Age: 75
End: 2025-07-24

## 2025-07-24 ENCOUNTER — OFFICE VISIT (OUTPATIENT)
Dept: HEMATOLOGY/ONCOLOGY | Facility: CLINIC | Age: 75
End: 2025-07-24
Payer: MEDICARE

## 2025-07-24 VITALS
OXYGEN SATURATION: 97 % | SYSTOLIC BLOOD PRESSURE: 137 MMHG | WEIGHT: 179 LBS | HEART RATE: 67 BPM | HEIGHT: 66 IN | RESPIRATION RATE: 16 BRPM | BODY MASS INDEX: 28.77 KG/M2 | DIASTOLIC BLOOD PRESSURE: 73 MMHG

## 2025-07-24 DIAGNOSIS — D64.89 ANEMIA DUE TO OTHER CAUSE, NOT CLASSIFIED: Primary | ICD-10-CM

## 2025-07-24 PROCEDURE — 99214 OFFICE O/P EST MOD 30 MIN: CPT | Mod: S$PBB,,, | Performed by: NURSE PRACTITIONER

## 2025-07-24 NOTE — TELEPHONE ENCOUNTER
Copied from CRM #[id number]. Topic: General Inquiry - Test Results  >> Jul 24, 2025  9:06 AM Shelia wrote:  Type:  Test Results    Who Called:  patient   Name of Test (Lab/Mammo/Etc):  labs  Date of Test:  7/23/25  Ordering Provider:  Dr VALE Patrick   Where the test was performed:   Would the patient rather a call back or a response via [facility]? Call back   Best Call Back Number:  [phone]    Additional Information:

## 2025-07-24 NOTE — PROGRESS NOTES
HEMATOLOGY INITIAL CONSULTATION NOTE    Patient ID: Delta Pedersen is a 75 y.o. male.    Chief Complaint:  Anemia    HPI:  Patient is a 75-year-old male with past medical history of seasonal allergies, chronic pain, chronic sinusitis, diabetes mellitus, hepatic cirrhosis with hepatic nodule being followed at [facility], hypertension, hyperlipidemia who was referred for evaluation of anemia.  Patient denies any dark-colored stools.  Reports being up-to-date on colonoscopy.  Presents to our clinic today for further evaluation      Past Medical History:   Diagnosis Date    Allergies     Chronic pain     Chronic sinusitis, unspecified     DM (diabetes mellitus)     Elevated cholesterol     Encounter for blood transfusion     HTN (hypertension)        Family History   Problem Relation Name Age of Onset    Stroke Mother      Cancer Father      Hypertension Sister      Hypertension Maternal Aunt      Hypertension Maternal Uncle      Hypertension Paternal Aunt      Hypertension Paternal Uncle      Hypertension Maternal Grandmother      Hypertension Maternal Grandfather      Hypertension Paternal Grandmother      Hypertension Paternal Grandfather         Social History[1]      Past Surgical History:   Procedure Laterality Date    LEG SURGERY Right              Review of systems:  Review of Systems   Constitutional:  Negative for activity change, appetite change, chills, diaphoresis, fatigue and unexpected weight change.   HENT:  Negative for congestion, facial swelling, hearing loss, mouth sores, trouble swallowing and voice change.    Eyes:  Negative for photophobia, pain, discharge and itching.   Respiratory:  Negative for apnea, cough, choking, chest tightness and shortness of breath.    Cardiovascular:  Negative for chest pain, palpitations and leg swelling.   Gastrointestinal:  Negative for abdominal distention, abdominal pain, anal bleeding and blood in stool.   Endocrine: Negative for cold intolerance, heat  intolerance, polydipsia and polyphagia.   Genitourinary:  Negative for difficulty urinating, dysuria, flank pain and hematuria.   Musculoskeletal:  Negative for arthralgias, back pain, joint swelling, myalgias, neck pain and neck stiffness.   Skin:  Negative for color change, pallor and wound.   Allergic/Immunologic: Negative for environmental allergies, food allergies and immunocompromised state.   Neurological:  Negative for dizziness, seizures, facial asymmetry, speech difficulty, light-headedness, numbness and headaches.   Hematological:  Negative for adenopathy. Does not bruise/bleed easily.   Psychiatric/Behavioral:  Negative for agitation, behavioral problems, confusion, decreased concentration and sleep disturbance.                                            Physical Exam  Vitals and nursing note reviewed.   Constitutional:       General: He is not in acute distress.     Appearance: Normal appearance. He is not ill-appearing.   HENT:      Head: Normocephalic and atraumatic.      Nose: No congestion or rhinorrhea.   Eyes:      General: No scleral icterus.     Extraocular Movements: Extraocular movements intact.      Pupils: Pupils are equal, round, and reactive to light.   Cardiovascular:      Rate and Rhythm: Normal rate and regular rhythm.      Pulses: Normal pulses.      Heart sounds: Normal heart sounds. No murmur heard.     No gallop.   Pulmonary:      Effort: Pulmonary effort is normal. No respiratory distress.      Breath sounds: Normal breath sounds. No stridor. No wheezing or rhonchi.   Abdominal:      General: Bowel sounds are normal. There is no distension.      Palpations: There is no mass.      Tenderness: There is no abdominal tenderness. There is no guarding.   Musculoskeletal:         General: No swelling, tenderness, deformity or signs of injury. Normal range of motion.      Cervical back: Normal range of motion and neck supple. No rigidity. No muscular tenderness.      Right lower leg: No  edema.      Left lower leg: No edema.   Skin:     General: Skin is warm.      Coloration: Skin is not jaundiced or pale.      Findings: No bruising or lesion.   Neurological:      General: No focal deficit present.      Mental Status: He is alert and oriented to person, place, and time.      Cranial Nerves: No cranial nerve deficit.      Sensory: No sensory deficit.      Motor: No weakness.      Gait: Gait normal.   Psychiatric:         Mood and Affect: Mood normal.         Behavior: Behavior normal.         Thought Content: Thought content normal.       Vitals:    07/24/25 1023   BP: 137/73   Pulse: 67   Resp: 16   Body surface area is 1.94 meters squared.    Assessment/Plan:      Anemia:    == Reviewed clinical history and history of liver cirrhosis with a liver nodule.  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  I will obtain peripheral smear and flow cytometry for completion.  Considering his age MDS is also a possibility but will obtain relevant labs:  Including checking for B12, folate and iron profile for completion.  If any abnormalities are noted on flow then will consider a bone marrow biopsy.  In the interim I discussed with him several etiologies possible for anemia in this patient giving him opportunity to ask questions  7/9/25: c/o excessive weakness and fatigue, hgb 8.3, no evidence of GENIA, iron sat 54%, UTD on c-scope and EGD with Dr Salmon at [facility] in 5/2025. Flow cytometry and peripheral smear WNL.  Discussed possible causes for fatigue, advised to f/u with PCP.   7/24/25: walk in appt with c/o excessive fatigue, hgb 8.4, did transfuse on 7/9/25 for same complaints with similar hgb but resulted in CHF exab and no improvement in fatigued. Explained causes of chronic fatigue and anemia secondary to chronic illness, like diabetes, CHF, A fib, HOLLAND, which he reports he is not wearing his cpap. Advised to use  cpap and to f/u with cardio and pcp for further evaluation    Plan:      Return to clinic in 8 weeks for MD visit after the results of the above lab work is back cbc cmp           Pt is instructed to RTC with labs for continued monitoring of treatment as instructed.     Total time spent in counseling and discussion about further management options including relevant lab work, treatment,  prognosis, medications and intended side effects was more than 30 minutes. More than 50 % of the time was spent in counseling and coordination of care.  I spent a total of 30 minutes on the day of the visit.This includes face to face time and non-face to face time preparing to see the patient (eg, review of tests), Obtaining and/or reviewing separately obtained history, Documenting clinical information in the electronic or other health record, Independently interpreting resultsand communicating results to the patient/family/caregiver, or Care coordination.              [1]   Social History  Socioeconomic History    Marital status: Single   Tobacco Use    Smoking status: Former     Types: Cigarettes     Passive exposure: Past    Smokeless tobacco: Never   Substance and Sexual Activity    Alcohol use: Not Currently    Drug use: Never    Sexual activity: Yes     Social Drivers of Health     Financial Resource Strain: Low Risk  (7/13/2025)    Received from [facility]    Overall Financial Resource Strain (CARDIA)     Difficulty of Paying Living Expenses: Not very hard   Food Insecurity: No Food Insecurity (7/13/2025)    Received from [facility]    Hunger Vital Sign     Worried About Running Out of Food in the Last Year: Never true     Ran Out of Food in the Last Year: Never true   Transportation Needs: No Transportation Needs (7/13/2025)    Received from [facility]    PRAPARE - Transportation     In the past 12 months, has lack of transportation kept you from medical appointments or from getting medications?: No   Housing  Stability: Low Risk  (7/13/2025)    Received from [facility] Stability Vital Sign     At any time in the past 12 months, were you homeless or living in a shelter (including now)?: No

## 2025-08-25 ENCOUNTER — TELEPHONE (OUTPATIENT)
Dept: HEMATOLOGY/ONCOLOGY | Facility: CLINIC | Age: 75
End: 2025-08-25
Payer: MEDICARE

## 2025-08-27 ENCOUNTER — OFFICE VISIT (OUTPATIENT)
Dept: HEMATOLOGY/ONCOLOGY | Facility: CLINIC | Age: 75
End: 2025-08-27
Payer: MEDICARE

## 2025-08-27 VITALS
OXYGEN SATURATION: 97 % | DIASTOLIC BLOOD PRESSURE: 66 MMHG | WEIGHT: 173 LBS | BODY MASS INDEX: 27.8 KG/M2 | SYSTOLIC BLOOD PRESSURE: 117 MMHG | HEIGHT: 66 IN | HEART RATE: 69 BPM | RESPIRATION RATE: 16 BRPM

## 2025-08-27 DIAGNOSIS — N18.31 STAGE 3A CHRONIC KIDNEY DISEASE: ICD-10-CM

## 2025-08-27 DIAGNOSIS — D64.89 ANEMIA DUE TO OTHER CAUSE, NOT CLASSIFIED: Primary | ICD-10-CM

## 2025-08-27 PROCEDURE — G2211 COMPLEX E/M VISIT ADD ON: HCPCS | Mod: ,,, | Performed by: NURSE PRACTITIONER

## 2025-08-27 PROCEDURE — 99214 OFFICE O/P EST MOD 30 MIN: CPT | Mod: S$PBB,,, | Performed by: NURSE PRACTITIONER

## 2025-08-27 RX ORDER — SPIRONOLACTONE 25 MG/1
50 TABLET ORAL
COMMUNITY
Start: 2025-03-24